# Patient Record
Sex: MALE | Race: WHITE | NOT HISPANIC OR LATINO | Employment: FULL TIME | URBAN - METROPOLITAN AREA
[De-identification: names, ages, dates, MRNs, and addresses within clinical notes are randomized per-mention and may not be internally consistent; named-entity substitution may affect disease eponyms.]

---

## 2019-02-05 ENCOUNTER — HOSPITAL ENCOUNTER (OUTPATIENT)
Dept: OTHER | Facility: HOSPITAL | Age: 44
Discharge: HOME | End: 2019-02-05
Attending: NURSE PRACTITIONER

## 2019-02-05 ENCOUNTER — OFFICE VISIT CONVERTED (OUTPATIENT)
Dept: FAMILY MEDICINE CLINIC | Age: 44
End: 2019-02-05
Attending: NURSE PRACTITIONER

## 2019-02-05 LAB
ANION GAP SERPL CALC-SCNC: 22 MMOL/L (ref 8–19)
BUN SERPL-MCNC: 9 MG/DL (ref 5–25)
BUN/CREAT SERPL: 8 {RATIO} (ref 6–20)
CALCIUM SERPL-MCNC: 9.3 MG/DL (ref 8.7–10.4)
CHLORIDE SERPL-SCNC: 95 MMOL/L (ref 99–111)
CONV CO2: 23 MMOL/L (ref 22–32)
CREAT UR-MCNC: 1.16 MG/DL (ref 0.7–1.2)
ERYTHROCYTE [DISTWIDTH] IN BLOOD BY AUTOMATED COUNT: 11.4 % (ref 11.5–14.5)
GFR SERPLBLD BASED ON 1.73 SQ M-ARVRAT: >60 ML/MIN/{1.73_M2}
GLUCOSE SERPL-MCNC: 100 MG/DL (ref 70–99)
HBA1C MFR BLD: 16.8 G/DL (ref 14–18)
HCT VFR BLD AUTO: 45.5 % (ref 42–52)
MCH RBC QN AUTO: 31.5 PG (ref 27–31)
MCHC RBC AUTO-ENTMCNC: 36.9 G/DL (ref 33–37)
MCV RBC AUTO: 85.4 FL (ref 80–96)
OSMOLALITY SERPL CALC.SUM OF ELEC: 281 MOSM/KG (ref 273–304)
PLATELET # BLD AUTO: 193 10*3/UL (ref 130–400)
PMV BLD AUTO: 7.3 FL (ref 7.4–10.4)
POTASSIUM SERPL-SCNC: 3.7 MMOL/L (ref 3.5–5.3)
RBC # BLD AUTO: 5.33 10*6/UL (ref 4.7–6.1)
SODIUM SERPL-SCNC: 136 MMOL/L (ref 135–147)
WBC # BLD AUTO: 13.5 10*3/UL (ref 4.8–10.8)

## 2019-02-07 ENCOUNTER — HOSPITAL ENCOUNTER (OUTPATIENT)
Dept: OTHER | Facility: HOSPITAL | Age: 44
Discharge: HOME | End: 2019-02-07
Attending: NURSE PRACTITIONER

## 2019-02-07 LAB
AMOXICILLIN+CLAV SUSC ISLT: 4
AMPICILLIN SUSC ISLT: >=32
AMPICILLIN+SULBAC SUSC ISLT: 16
BACTERIA UR CULT: ABNORMAL
CEFAZOLIN SUSC ISLT: <=4
CEFEPIME SUSC ISLT: <=1
CEFTAZIDIME SUSC ISLT: <=1
CEFTRIAXONE SUSC ISLT: <=1
CEFUROXIME ORAL SUSC ISLT: 2
CEFUROXIME PARENTER SUSC ISLT: 2
CIPROFLOXACIN SUSC ISLT: <=0.25
ERTAPENEM SUSC ISLT: <=0.5
ERYTHROCYTE [DISTWIDTH] IN BLOOD BY AUTOMATED COUNT: 11.7 % (ref 11.5–14.5)
GENTAMICIN SUSC ISLT: <=1
HBA1C MFR BLD: 16.9 G/DL (ref 14–18)
HCT VFR BLD AUTO: 49.1 % (ref 42–52)
LEVOFLOXACIN SUSC ISLT: <=0.12
MCH RBC QN AUTO: 29.6 PG (ref 27–31)
MCHC RBC AUTO-ENTMCNC: 34.4 G/DL (ref 33–37)
MCV RBC AUTO: 86.1 FL (ref 80–96)
NITROFURANTOIN SUSC ISLT: <=16
PLATELET # BLD AUTO: 202 10*3/UL (ref 130–400)
PMV BLD AUTO: 9.6 FL (ref 7.4–10.4)
RBC # BLD AUTO: 5.7 10*6/UL (ref 4.7–6.1)
TETRACYCLINE SUSC ISLT: <=1
TMP SMX SUSC ISLT: <=20
TOBRAMYCIN SUSC ISLT: <=1
WBC # BLD AUTO: 7.3 10*3/UL (ref 4.8–10.8)

## 2019-05-02 ENCOUNTER — OFFICE VISIT CONVERTED (OUTPATIENT)
Dept: FAMILY MEDICINE CLINIC | Age: 44
End: 2019-05-02
Attending: NURSE PRACTITIONER

## 2021-05-18 NOTE — PROGRESS NOTES
Jose Petty 1975     Office/Outpatient Visit    Visit Date: Thu, May 2, 2019 04:05 pm    Provider: Mary Waters N.P. (Assistant: Akilah Harris MA)    Location: [facility]        Electronically signed by Mary Waters N.P. on  05/02/2019 07:21:34 PM                             SUBJECTIVE:        CC:     Mr. Petty is a 43 year old White male.  presents today due to allergy symptoms         HPI:         He complains of a sore throat.  States sore throat, ear pain, sinus congestion. Denies fever or headache. States symptoms for 1 week. Taking otc meds without much relief. States productive green cough.  Son had strep 2 weeks ago.     ROS:     CONSTITUTIONAL:  Negative for chills, fatigue and fever.      CARDIOVASCULAR:  Negative for chest pain, orthopnea, paroxysmal nocturnal dyspnea and pedal edema.      RESPIRATORY:  Positive for recent cough.   Negative for dyspnea or cough.      GASTROINTESTINAL:  Negative for abdominal pain, heartburn, constipation, diarrhea, and stool changes.      PSYCHIATRIC:  Negative for anxiety and depression.          PMH/FMH/SH:     Surgical History:         Positive for    Tonsillectomy/Adenoidectomy;         Family History:         Positive for Testicular Cancer ( father ).          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  Non-drinker             Current Problems:     Fever NOS     Screening for depression         Immunizations:     Fluzone Quadrivalent (3+ years) 10/5/2018         Allergies:     Last Reviewed on 2/05/2019 04:17 PM by Sofie Dowd    Penicillins:        Current Medications:     Last Reviewed on 2/05/2019 04:18 PM by Sofie Dowd    OTC Cranberry     Vitamin D *         OBJECTIVE:        Vitals:         Current: 5/2/2019 4:09:39 PM    Ht:  6 ft, 2 in;  Wt: 312.8 lbs;  BMI: 40.2     T: 98.4 F (oral);  BP: 153/92 mm Hg (left arm, sitting);  P: 84 bpm (left arm (BP Cuff), sitting);  sCr: 1.16 mg/dL;  GFR: 105.29        Exams:     PHYSICAL EXAM:     GENERAL: Vitals recorded well developed, well nourished;  well groomed;  no apparent distress;     EYES: lids and lacrimal system are normal in appearance; extraocular movements intact; conjunctiva and cornea are normal; PERRLA;     E/N/T: EARS: external auditory canal occluded by cerumen bilaterally;  EAC clear after irrigation; NOSE: nasal mucosa is erythematous;  OROPHARYNX: oral mucosa reveals erythema;     NECK:  supple, full ROM; no thyromegaly; no carotid bruits;     RESPIRATORY: normal respiratory rate and pattern with no distress; normal breath sounds with no rales, rhonchi, wheezes or rubs;     CARDIOVASCULAR: normal rate; rhythm is regular;  normal S1; normal S2; no systolic murmur; no cyanosis; no edema;     SKIN:  no significant rashes or lesions; no suspicious moles;     MUSCULOSKELETAL:  Normal range of motion, strength and tone;     NEUROLOGICAL:  cranial nerves, motor and sensory function, reflexes, gait and coordination are all intact;     PSYCHIATRIC:  appropriate affect and demeanor; normal speech pattern; grossly normal memory;         Procedures:     Cerumen impaction         Cerumen/Wax removal: Cerumen impaction is noted in both ears The degree of wax accumulation is moderate in the left ear and right ear.  With minimal difficulty, using a syringe irrigation, the wax is removed.  Removed from ear was hard balls of wax.  The patient tolerated the procedure well.      There were no complications.  Performed by: garry             ASSESSMENT:           466.0   J20.8  Acute bronchitis              DDx:     380.4   H61.23  Cerumen impaction              DDx:         ORDERS:         Meds Prescribed:       Doxycycline Monohydrate 100mg Capsules Take 1 capsule(s) by mouth bid x10 days  #20 (Twenty) capsule(s) Refills: 0       Bromfed-DM  (Brompheniramine/Dextromethorphan/Pseudoephedrine) Syrup 1  to 2  tsp po every 4-6 hrs prn cough/congestion.  #240 (Two [place]) ml Refills: 0         Procedures Ordered:       [id number]  Removal of impacted cerumen BILATERAL (NURSE)  (In-House)                   PLAN:          Acute bronchitis           Prescriptions:       Doxycycline Monohydrate 100mg Capsules Take 1 capsule(s) by mouth bid x10 days  #20 (Twenty) capsule(s) Refills: 0       Bromfed-DM (Brompheniramine/Dextromethorphan/Pseudoephedrine) Syrup 1  to 2  tsp po every 4-6 hrs prn cough/congestion.  #240 (Two [place]) ml Refills: 0          Cerumen impaction         TESTS/PROCEDURES:  Will proceed with Cerumen Removal--by Nurse: Both ears, to be performed/scheduled now.            Orders:       [id number]  Removal of impacted cerumen BILATERAL (NURSE)  (In-House)               CHARGE CAPTURE:           Primary Diagnosis:     466.0 Acute bronchitis            J20.8    Acute bronchitis due to other specified organisms              Orders:          [id number]   Office/outpatient visit; established patient, level 3  (In-House)           380.4 Cerumen impaction            H61.23    Impacted cerumen, bilateral              Orders:          [id number]   Removal of impacted cerumen BILATERAL (NURSE)  (In-House)

## 2021-05-18 NOTE — PROGRESS NOTES
Jose Petty 1975     Office/Outpatient Visit    Visit Date: Tue, Feb 5, 2019 04:11 pm    Provider: Sheila Valera N.P. (Assistant: Sofie Dowd MA)    Location: [facility]        Electronically signed by Sheila Valera N.P. on  02/07/2019 12:29:12 AM                             SUBJECTIVE:        CC:     Mr. Petty is a 43 year old male.  He presents with pain with urination, urinary frequency, 100.5 temp..          HPI:         PHQ-9 Depression Screening: Completed form scanned and in chart; Total Score 0 Alcohol Consumption Screening: Completed form scanned and in chart; Total Score 0         With regard to the dysuria, this was first noted yesterday.  Associated symptoms include fever and hematuria.  He denies associated abdominal pain, back pain, flank pain, hesitancy, urgency or urinary frequency.  He denies a history of benign prostatic hypertrophy and renal stones.  Treatments tried thus far include cranberry jiuce.  Mr. Petty states this is the first time he has experienced this kind of discomfort.      ROS:     CONSTITUTIONAL:  Positive for fatigue and fever.   Negative for chills or weight change.      CARDIOVASCULAR:  Negative for chest pain, orthopnea, paroxysmal nocturnal dyspnea and pedal edema.      RESPIRATORY:  Negative for dyspnea and cough.      GENITOURINARY:  Positive for dysuria, hematuria and (over the last few month).   Negative for history of recurrent UTIs, change in urine stream or testicular pain.      MUSCULOSKELETAL:  Negative for back pain.          PMH/FMH/SH:     Surgical History:         Positive for    Tonsillectomy/Adenoidectomy;         Family History:         Positive for Testicular Cancer ( father ).          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  Non-drinker             Current Problems:      Fever NOS     Dysuria     Screening for depression         Immunizations:     Fluzone Quadrivalent (3+ years) 10/5/2018         Allergies:     Last Reviewed on 2/05/2019 04:17 PM by Sofie Dowd    Penicillins:        Current Medications:     Last Reviewed on 2/05/2019 04:18 PM by Sofie Dowd    OTC Cranberry     Vitamin D *         OBJECTIVE:        Vitals:         Current: 2/5/2019 4:20:19 PM    Ht:  6 ft, 2 in;  Wt: 304.6 lbs;  BMI: 39.1    T: 99.4 F (oral);  BP: 143/83 mm Hg (right arm, sitting);  P: 97 bpm (right arm (BP Cuff), sitting)        Exams:     PHYSICAL EXAM:     GENERAL: Vitals recorded well developed, well nourished;  well groomed;  no apparent distress; low grade fever     NECK:  supple, full ROM; no thyromegaly; no carotid bruits;     RESPIRATORY: normal respiratory rate and pattern with no distress; normal breath sounds with no rales, rhonchi, wheezes or rubs;     CARDIOVASCULAR: normal rate; rhythm is regular;  normal S1; normal S2; no systolic murmur; no cyanosis; no edema;     GASTROINTESTINAL: nontender, nondistended; no hepatosplenomegaly or masses; no bruits; nontender; normal bowel sounds;     MUSCULOSKELETAL: No CVA tenderness     NEUROLOGICAL:  cranial nerves, motor and sensory function, reflexes, gait and coordination are all intact;     PSYCHIATRIC:  appropriate affect and demeanor; normal speech pattern; grossly normal memory;         Lab/Test Results:             Glucose, Urine:  Neg (02/05/2019),     Bilirubin, urine:  Small (02/05/2019),     Ketones, Urine Strip:  Negative (02/05/2019),     Specific Gravity, urine:  1.020 (02/05/2019),     Blood in Urine:  large (02/05/2019),     pH, urine:  6.0 (02/05/2019),     Protein Urine QL:  100 mg (02/05/2019),     Urobilinogen, urine:  1.0 E.U./dL (02/05/2019),     Nitrite, Urine:  Positive (02/05/2019),     Leukoctyes, urine:  Small (02/05/2019),     Appearance:  Cloudy (02/05/2019),     collection source:  Clean-catch  (02/05/2019),     Color:  Kisha (02/05/2019),     Performed by::  mnp @ 1636 (02/05/2019),             ASSESSMENT:           V79.0   Z13.89  Screening for depression              DDx:     788.1   R30.0  Dysuria              DDx:     780.60   R50.9  Fever NOS              DDx:         ORDERS:         Meds Prescribed:       Cipro (Ciprofloxacin HCl) 500mg Tablet Take 1 tablet(s) by mouth q12h for 10 days  #20 (Twenty) tablet(s) Refills: 0         Lab Orders:       [id number]  URCU - [facility] Urine Culture  (Send-Out)         52189  Urinalysis, automated, without microscopy  (In-House)         79642  BDCB2 - [facility] CBC w/o diff  (Send-Out)         61945  BMP - [facility] Basic Metabolic Panel  (Send-Out)           Other Orders:         Depression screen negative  (In-House)                   PLAN:          Screening for depression     MIPS PHQ-9 Depression Screening Completed form scanned and in chart; Total Score 0           Orders:         Depression screen negative  (In-House)            Dysuria     LABORATORY:  Labs ordered to be performed today include basic metabolic panel, Urine culture, and UA dip in office no micro.            Prescriptions:       Cipro (Ciprofloxacin HCl) 500mg Tablet Take 1 tablet(s) by mouth q12h for 10 days  #20 (Twenty) tablet(s) Refills: 0           Orders:       [id number]  URCU - [facility] Urine Culture  (Send-Out)         03787  Urinalysis, automated, without microscopy  (In-House)         40476  BMP - [facility] Basic Metabolic Panel  (Send-Out)            Fever NOS     LABORATORY:  Labs ordered to be performed today include CBC W/O DIFF.            Orders:       [id number]  BDCB2 - [facility] CBC w/o diff  (Send-Out)               CHARGE CAPTURE:           Primary Diagnosis:     V79.0 Screening for depression            Z13.89    Encounter for screening for other disorder              Orders:          19375   Office visit - new pt, level 2  (In-House)                Depression screen negative  (In-House)            788.1 Dysuria            R30.0    Dysuria              Orders:          [id number]   Urinalysis, automated, without microscopy  (In-House)           780.60 Fever NOS            R50.9    Fever, unspecified

## 2021-07-01 VITALS
BODY MASS INDEX: 39.09 KG/M2 | TEMPERATURE: 99.4 F | SYSTOLIC BLOOD PRESSURE: 143 MMHG | DIASTOLIC BLOOD PRESSURE: 83 MMHG | WEIGHT: 304.6 LBS | HEART RATE: 97 BPM | HEIGHT: 74 IN

## 2021-07-01 VITALS
SYSTOLIC BLOOD PRESSURE: 153 MMHG | HEIGHT: 74 IN | BODY MASS INDEX: 40.14 KG/M2 | DIASTOLIC BLOOD PRESSURE: 92 MMHG | WEIGHT: 312.8 LBS | TEMPERATURE: 98.4 F | HEART RATE: 84 BPM

## 2022-10-12 ENCOUNTER — OFFICE VISIT (OUTPATIENT)
Dept: FAMILY MEDICINE CLINIC | Age: 47
End: 2022-10-12

## 2022-10-12 ENCOUNTER — HOSPITAL ENCOUNTER (OUTPATIENT)
Dept: GENERAL RADIOLOGY | Facility: HOSPITAL | Age: 47
Discharge: HOME | End: 2022-10-12

## 2022-10-12 ENCOUNTER — LAB (OUTPATIENT)
Dept: LAB | Facility: HOSPITAL | Age: 47
End: 2022-10-12

## 2022-10-12 VITALS
WEIGHT: 315 LBS | HEIGHT: 74 IN | BODY MASS INDEX: 40.43 KG/M2 | DIASTOLIC BLOOD PRESSURE: 85 MMHG | SYSTOLIC BLOOD PRESSURE: 152 MMHG | HEART RATE: 81 BPM

## 2022-10-12 DIAGNOSIS — Z13.1 SCREENING FOR DIABETES MELLITUS (DM): ICD-10-CM

## 2022-10-12 DIAGNOSIS — N23 KIDNEY PAIN: ICD-10-CM

## 2022-10-12 DIAGNOSIS — R10.9 LEFT FLANK PAIN: ICD-10-CM

## 2022-10-12 DIAGNOSIS — Z23 ENCOUNTER FOR IMMUNIZATION: ICD-10-CM

## 2022-10-12 DIAGNOSIS — R10.9 LEFT FLANK PAIN: Primary | ICD-10-CM

## 2022-10-12 LAB
ALBUMIN SERPL-MCNC: 4.2 G/DL (ref 3.5–5.2)
ALBUMIN/GLOB SERPL: 1.7 G/DL
ALP SERPL-CCNC: 63 U/L (ref 39–117)
ALT SERPL W P-5'-P-CCNC: 35 U/L (ref 1–41)
ANION GAP SERPL CALCULATED.3IONS-SCNC: 9 MMOL/L (ref 5–15)
AST SERPL-CCNC: 23 U/L (ref 1–40)
BILIRUB BLD-MCNC: NEGATIVE MG/DL
BILIRUB SERPL-MCNC: 0.5 MG/DL (ref 0–1.2)
BUN SERPL-MCNC: 9 MG/DL (ref 6–20)
BUN/CREAT SERPL: 10.3 (ref 7–25)
CALCIUM SPEC-SCNC: 9.3 MG/DL (ref 8.6–10.5)
CHLORIDE SERPL-SCNC: 101 MMOL/L (ref 98–107)
CLARITY, POC: CLEAR
CO2 SERPL-SCNC: 29 MMOL/L (ref 22–29)
COLOR UR: YELLOW
CREAT SERPL-MCNC: 0.87 MG/DL (ref 0.76–1.27)
DEPRECATED RDW RBC AUTO: 37.9 FL (ref 37–54)
EGFRCR SERPLBLD CKD-EPI 2021: 107.8 ML/MIN/1.73
ERYTHROCYTE [DISTWIDTH] IN BLOOD BY AUTOMATED COUNT: 11.8 % (ref 12.3–15.4)
EXPIRATION DATE: NORMAL
GLOBULIN UR ELPH-MCNC: 2.5 GM/DL
GLUCOSE SERPL-MCNC: 106 MG/DL (ref 65–99)
GLUCOSE UR STRIP-MCNC: NEGATIVE MG/DL
HBA1C MFR BLD: 5.7 % (ref 4.8–5.6)
HCT VFR BLD AUTO: 48.4 % (ref 37.5–51)
HGB BLD-MCNC: 16.1 G/DL (ref 13–17.7)
KETONES UR QL: NEGATIVE
LEUKOCYTE EST, POC: NEGATIVE
LIPASE SERPL-CCNC: 37 U/L (ref 13–60)
Lab: NORMAL
MCH RBC QN AUTO: 29 PG (ref 26.6–33)
MCHC RBC AUTO-ENTMCNC: 33.3 G/DL (ref 31.5–35.7)
MCV RBC AUTO: 87.2 FL (ref 79–97)
NITRITE UR-MCNC: NEGATIVE MG/ML
PH UR: 6 [PH] (ref 5–8)
PLATELET # BLD AUTO: 174 10*3/MM3 (ref 140–450)
PMV BLD AUTO: 9.3 FL (ref 6–12)
POTASSIUM SERPL-SCNC: 4.6 MMOL/L (ref 3.5–5.2)
PROT SERPL-MCNC: 6.7 G/DL (ref 6–8.5)
PROT UR STRIP-MCNC: NEGATIVE MG/DL
RBC # BLD AUTO: 5.55 10*6/MM3 (ref 4.14–5.8)
RBC # UR STRIP: NEGATIVE /UL
SODIUM SERPL-SCNC: 139 MMOL/L (ref 136–145)
SP GR UR: 1 (ref 1–1.03)
UROBILINOGEN UR QL: NORMAL
WBC NRBC COR # BLD: 6.29 10*3/MM3 (ref 3.4–10.8)

## 2022-10-12 PROCEDURE — 36415 COLL VENOUS BLD VENIPUNCTURE: CPT

## 2022-10-12 PROCEDURE — 80053 COMPREHEN METABOLIC PANEL: CPT

## 2022-10-12 PROCEDURE — 85027 COMPLETE CBC AUTOMATED: CPT

## 2022-10-12 PROCEDURE — 81003 URINALYSIS AUTO W/O SCOPE: CPT | Performed by: NURSE PRACTITIONER

## 2022-10-12 PROCEDURE — 99203 OFFICE O/P NEW LOW 30 MIN: CPT | Performed by: NURSE PRACTITIONER

## 2022-10-12 PROCEDURE — 83690 ASSAY OF LIPASE: CPT

## 2022-10-12 PROCEDURE — 90686 IIV4 VACC NO PRSV 0.5 ML IM: CPT | Performed by: NURSE PRACTITIONER

## 2022-10-12 PROCEDURE — 74018 RADEX ABDOMEN 1 VIEW: CPT

## 2022-10-12 PROCEDURE — 83036 HEMOGLOBIN GLYCOSYLATED A1C: CPT

## 2022-10-12 PROCEDURE — 90471 IMMUNIZATION ADMIN: CPT | Performed by: NURSE PRACTITIONER

## 2022-10-12 RX ORDER — IBUPROFEN 800 MG/1
800 TABLET ORAL EVERY 8 HOURS PRN
Qty: 30 TABLET | Refills: 0 | Status: SHIPPED | OUTPATIENT
Start: 2022-10-12

## 2022-10-12 RX ORDER — MELOXICAM 15 MG/1
15 TABLET ORAL DAILY
COMMUNITY
Start: 2022-09-02 | End: 2022-10-12

## 2022-10-12 NOTE — PROGRESS NOTES
"Chief Complaint  Kidney Pain (Follow up, pt states his kidney pain is still occurring./) and Establish Care    Subjective          Jose Petty presents to [facility]  History of Present Illness  He is here to establish care. It has been over 3 years since he was last seen by our practice.     Left flank pain: Left flank pain has been present for since Friday/Saturday. He reports that it feels more like a spasm and is constant. No known triggers. Denies alleviating factors. Has been trying to increase water. He denies N/V, fever, chills, dysuria, urinary frequency, and urinary urgency. No known h/o kidney stones. No family history of kidney stones. No h/o injury to that could be causing his pain. The pain does radiate to his left back.      Objective   Vital Signs:   /85 (BP Location: Left arm, Patient Position: Sitting)   Pulse 81   Ht 188 cm (74\")   Wt (!) 143 kg (316 lb 4.8 oz)   BMI 40.61 kg/m²     Physical Exam  Constitutional:       General: He is not in acute distress.     Appearance: Normal appearance. He is normal weight.   HENT:      Head: Normocephalic.   Eyes:      Pupils: Pupils are equal, round, and reactive to light.      Visual Fields: Right eye visual fields normal and left eye visual fields normal.   Neck:      Trachea: Trachea normal.   Cardiovascular:      Rate and Rhythm: Normal rate and regular rhythm.      Heart sounds: Normal heart sounds.   Pulmonary:      Effort: Pulmonary effort is normal.      Breath sounds: Normal breath sounds and air entry.   Abdominal:      Tenderness: There is abdominal tenderness (left flank with deep palpation). There is no right CVA tenderness or left CVA tenderness.   Musculoskeletal:      Right lower leg: No edema.      Left lower leg: No edema.   Skin:     General: Skin is warm and dry.   Neurological:      Mental Status: He is alert and oriented to person, place, and time.   Psychiatric:         Mood and Affect: " Mood and affect normal.         Behavior: Behavior normal.         Thought Content: Thought content normal.        Result Review :   The following data was reviewed by: LAVERNE Hicks on 10/12/2022:                  Assessment and Plan    Diagnoses and all orders for this visit:    1. Left flank pain (Primary)  -     CBC (No Diff); Future  -     Comprehensive Metabolic Panel; Future  -     Lipase; Future  -     XR Abdomen KUB; Future  -     ibuprofen (ADVIL,MOTRIN) 800 MG tablet; Take 1 tablet by mouth Every 8 (Eight) Hours As Needed for Mild Pain.  Dispense: 30 tablet; Refill: 0    2. Kidney pain  -     POCT urinalysis dipstick, automated    3. Screening for diabetes mellitus (DM)  -     Hemoglobin A1c; Future    4. Encounter for immunization  -     FluLaval/Fluzone >6 mos ([id number])      He does not have left CVA tenderness, but he does have some tenderness on his left side with deep palpation.  We will obtain a CBC, CMP, lipase, and KUB.  UA was completely normal in office.  We will send in some ibuprofen to see if it helps.  He would also like to have his A1c checked.    Follow Up   Return in about 4 weeks (around 11/9/2022) for Pending test results, Annual physical.  Patient was given instructions and counseling regarding his condition or for health maintenance advice. Please see specific information pulled into the AVS if appropriate.